# Patient Record
Sex: FEMALE | Race: WHITE
[De-identification: names, ages, dates, MRNs, and addresses within clinical notes are randomized per-mention and may not be internally consistent; named-entity substitution may affect disease eponyms.]

---

## 2019-02-26 ENCOUNTER — RECORD ABSTRACTING (OUTPATIENT)
Age: 59
End: 2019-02-26

## 2019-02-26 DIAGNOSIS — Z82.49 FAMILY HISTORY OF ISCHEMIC HEART DISEASE AND OTHER DISEASES OF THE CIRCULATORY SYSTEM: ICD-10-CM

## 2019-02-26 DIAGNOSIS — Z86.79 PERSONAL HISTORY OF OTHER DISEASES OF THE CIRCULATORY SYSTEM: ICD-10-CM

## 2019-02-26 DIAGNOSIS — Z83.49 FAMILY HISTORY OF OTHER ENDOCRINE, NUTRITIONAL AND METABOLIC DISEASES: ICD-10-CM

## 2019-02-26 DIAGNOSIS — Z80.42 FAMILY HISTORY OF MALIGNANT NEOPLASM OF PROSTATE: ICD-10-CM

## 2019-02-26 DIAGNOSIS — Z84.0 FAMILY HISTORY OF DISEASES OF THE SKIN AND SUBCUTANEOUS TISSUE: ICD-10-CM

## 2019-02-26 DIAGNOSIS — Z78.9 OTHER SPECIFIED HEALTH STATUS: ICD-10-CM

## 2019-02-26 DIAGNOSIS — Z80.9 FAMILY HISTORY OF MALIGNANT NEOPLASM, UNSPECIFIED: ICD-10-CM

## 2019-02-26 LAB — CYTOLOGY CVX/VAG DOC THIN PREP: NORMAL

## 2019-02-26 RX ORDER — VALSARTAN 80 MG/1
80 TABLET ORAL
Refills: 0 | Status: ACTIVE | COMMUNITY

## 2019-02-26 RX ORDER — DOCOSANOL 100 MG/G
10 CREAM TOPICAL
Refills: 0 | Status: ACTIVE | COMMUNITY

## 2019-02-26 RX ORDER — MULTIVIT-MIN/FOLIC/VIT K/LYCOP 400-300MCG
25 MCG TABLET ORAL
Refills: 0 | Status: ACTIVE | COMMUNITY

## 2019-02-26 RX ORDER — ASCORBIC ACID 500 MG
TABLET ORAL
Refills: 0 | Status: ACTIVE | COMMUNITY

## 2019-03-27 ENCOUNTER — APPOINTMENT (OUTPATIENT)
Dept: OBGYN | Facility: CLINIC | Age: 59
End: 2019-03-27
Payer: COMMERCIAL

## 2019-03-27 ENCOUNTER — TRANSCRIPTION ENCOUNTER (OUTPATIENT)
Age: 59
End: 2019-03-27

## 2019-03-27 VITALS
BODY MASS INDEX: 19.85 KG/M2 | SYSTOLIC BLOOD PRESSURE: 118 MMHG | DIASTOLIC BLOOD PRESSURE: 60 MMHG | HEIGHT: 68 IN | WEIGHT: 131 LBS

## 2019-03-27 DIAGNOSIS — R92.2 INCONCLUSIVE MAMMOGRAM: ICD-10-CM

## 2019-03-27 DIAGNOSIS — Z12.31 ENCOUNTER FOR SCREENING MAMMOGRAM FOR MALIGNANT NEOPLASM OF BREAST: ICD-10-CM

## 2019-03-27 DIAGNOSIS — Z00.00 ENCOUNTER FOR GENERAL ADULT MEDICAL EXAMINATION W/OUT ABNORMAL FINDINGS: ICD-10-CM

## 2019-03-27 PROCEDURE — 99396 PREV VISIT EST AGE 40-64: CPT

## 2019-03-27 RX ORDER — ACYCLOVIR 50 MG/G
5 CREAM TOPICAL
Qty: 1 | Refills: 5 | Status: ACTIVE | COMMUNITY
Start: 2019-03-27 | End: 1900-01-01

## 2019-03-27 RX ORDER — VALACYCLOVIR 500 MG/1
500 TABLET, FILM COATED ORAL TWICE DAILY
Qty: 10 | Refills: 3 | Status: ACTIVE | COMMUNITY
Start: 2019-03-27 | End: 1900-01-01

## 2019-04-05 LAB
CYTOLOGY CVX/VAG DOC THIN PREP: NORMAL
HPV HIGH+LOW RISK DNA PNL CVX: NOT DETECTED

## 2020-03-24 DIAGNOSIS — R92.2 INCONCLUSIVE MAMMOGRAM: ICD-10-CM

## 2020-08-26 ENCOUNTER — APPOINTMENT (OUTPATIENT)
Dept: OBGYN | Facility: CLINIC | Age: 60
End: 2020-08-26
Payer: COMMERCIAL

## 2020-08-26 VITALS
BODY MASS INDEX: 20.31 KG/M2 | WEIGHT: 134 LBS | DIASTOLIC BLOOD PRESSURE: 72 MMHG | SYSTOLIC BLOOD PRESSURE: 118 MMHG | HEIGHT: 68 IN

## 2020-08-26 DIAGNOSIS — Z12.11 ENCOUNTER FOR SCREENING FOR MALIGNANT NEOPLASM OF COLON: ICD-10-CM

## 2020-08-26 PROCEDURE — 99396 PREV VISIT EST AGE 40-64: CPT

## 2021-10-28 ENCOUNTER — APPOINTMENT (OUTPATIENT)
Dept: OBGYN | Facility: CLINIC | Age: 61
End: 2021-10-28
Payer: COMMERCIAL

## 2021-10-28 VITALS
SYSTOLIC BLOOD PRESSURE: 110 MMHG | DIASTOLIC BLOOD PRESSURE: 62 MMHG | HEIGHT: 68 IN | BODY MASS INDEX: 19.85 KG/M2 | WEIGHT: 131 LBS

## 2021-10-28 PROCEDURE — 99396 PREV VISIT EST AGE 40-64: CPT

## 2021-10-28 NOTE — HISTORY OF PRESENT ILLNESS
[FreeTextEntry1] :  62yo  postmenopausal here for annual Gyn exam. Has infrequent HSV outbreaks( presacral location) uses Zovirax when symptomatic only. Daughter, Moni, at [place] finished 3rd year of 5 year OT program will finish BS in May then complete program . Dad passed away 2018(massive stroke).Mom(92) still independent living in  55+ community near pt's home in [place]. 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 Got Pfizer vaccine in April. Has a new granchild(stepdaughter's baby) born in March. Has never had colonoscopy and doesn't want to...did Cologuard last year-> negative. Not sexually active(ok with it)\par  \par Last Mammogram was with U/S 21 BIRADS 2D. Last bone density studies 2019 at work "good". Last cervical pap smear was 3/27/19 Neg/HPV Neg. \par \par \par \par OB History\par Total pregnancies  1.  Total living children  1.  Pregnancy 1:  2000, normal spontaneous vaginal delivery (), Birth Weight:6lbs 11oz, Female "Moni" .  NVD  1.

## 2022-06-24 DIAGNOSIS — Z12.39 ENCOUNTER FOR OTHER SCREENING FOR MALIGNANT NEOPLASM OF BREAST: ICD-10-CM

## 2023-02-02 ENCOUNTER — APPOINTMENT (OUTPATIENT)
Dept: OBGYN | Facility: CLINIC | Age: 63
End: 2023-02-02
Payer: COMMERCIAL

## 2023-02-02 ENCOUNTER — NON-APPOINTMENT (OUTPATIENT)
Age: 63
End: 2023-02-02

## 2023-02-02 VITALS
SYSTOLIC BLOOD PRESSURE: 112 MMHG | BODY MASS INDEX: 17.61 KG/M2 | DIASTOLIC BLOOD PRESSURE: 60 MMHG | HEIGHT: 72 IN | WEIGHT: 130 LBS

## 2023-02-02 DIAGNOSIS — Z01.419 ENCOUNTER FOR GYNECOLOGICAL EXAMINATION (GENERAL) (ROUTINE) W/OUT ABNORMAL FINDINGS: ICD-10-CM

## 2023-02-02 DIAGNOSIS — B00.9 HERPESVIRAL INFECTION, UNSPECIFIED: ICD-10-CM

## 2023-02-02 PROCEDURE — 99396 PREV VISIT EST AGE 40-64: CPT

## 2023-02-02 NOTE — HISTORY OF PRESENT ILLNESS
[Patient reported bone density results were normal] : Patient reported bone density results were normal [FreeTextEntry1] : 63yo  postmenopausal here for annual Gyn exam. Has infrequent HSV outbreaks( left presacral location) uses Zovirax when symptomatic only.  had MI with stent placement a few weeks ago. Doing well Daughter, Moni, at [place] in final year of 5 year OT program. Dad passed away 2018(massive stroke).Mom(Shanna) still independent living in 55+ community near pt's home in [place]. Neeta works in [employer],company has changed...stressful. Has two grandchildren(stepdaughter's )Jarvis born in 2021 and Jose J born last month.. Has never had colonoscopy and doesn't want to...did Cologuard -> negative. Not sexually active(ok with it)\par \par \par \par OB History\par Total pregnancies 1. Total living children 1. Pregnancy 1: 2000, normal spontaneous vaginal delivery (), Birth Weight:6lbs 11oz, Female "Moni" . NVD 1. \par  [Mammogramdate] : 10/14/22 [TextBox_19] : BIRADS 2D [BreastSonogramDate] : 10/14/22 [TextBox_25] : BIRADS 2D [PapSmeardate] : 3/27/19 [TextBox_31] : Neg/HPV Neg [BoneDensityDate] : 2019 [ColonoscopyDate] : 2020 [TextBox_43] : Silvestre Saleh

## 2023-02-07 LAB
CYTOLOGY CVX/VAG DOC THIN PREP: ABNORMAL
HPV HIGH+LOW RISK DNA PNL CVX: NOT DETECTED

## 2023-09-01 ENCOUNTER — APPOINTMENT (OUTPATIENT)
Dept: OBGYN | Facility: CLINIC | Age: 63
End: 2023-09-01
Payer: COMMERCIAL

## 2023-09-01 VITALS
DIASTOLIC BLOOD PRESSURE: 60 MMHG | SYSTOLIC BLOOD PRESSURE: 120 MMHG | HEIGHT: 68 IN | BODY MASS INDEX: 19.7 KG/M2 | WEIGHT: 130 LBS

## 2023-09-01 DIAGNOSIS — L02.33 CARBUNCLE OF BUTTOCK: ICD-10-CM

## 2023-09-01 PROCEDURE — 99213 OFFICE O/P EST LOW 20 MIN: CPT

## 2023-09-01 RX ORDER — CEPHALEXIN 500 MG/1
500 CAPSULE ORAL 4 TIMES DAILY
Qty: 14 | Refills: 0 | Status: ACTIVE | COMMUNITY
Start: 2023-09-01 | End: 1900-01-01

## 2023-09-01 RX ORDER — ACYCLOVIR 50 MG/G
5 OINTMENT TOPICAL
Qty: 1 | Refills: 0 | Status: ACTIVE | COMMUNITY

## 2024-01-20 DIAGNOSIS — R92.8 OTHER ABNORMAL AND INCONCLUSIVE FINDINGS ON DIAGNOSTIC IMAGING OF BREAST: ICD-10-CM

## 2024-08-08 ENCOUNTER — APPOINTMENT (OUTPATIENT)
Dept: OBGYN | Facility: CLINIC | Age: 64
End: 2024-08-08

## 2024-08-21 ENCOUNTER — APPOINTMENT (OUTPATIENT)
Dept: OBGYN | Facility: CLINIC | Age: 64
End: 2024-08-21
Payer: COMMERCIAL

## 2024-08-21 VITALS
BODY MASS INDEX: 19.7 KG/M2 | WEIGHT: 130 LBS | DIASTOLIC BLOOD PRESSURE: 72 MMHG | HEIGHT: 68 IN | SYSTOLIC BLOOD PRESSURE: 120 MMHG

## 2024-08-21 DIAGNOSIS — Z01.419 ENCOUNTER FOR GYNECOLOGICAL EXAMINATION (GENERAL) (ROUTINE) W/OUT ABNORMAL FINDINGS: ICD-10-CM

## 2024-08-21 PROCEDURE — 99396 PREV VISIT EST AGE 40-64: CPT

## 2024-08-21 NOTE — HISTORY OF PRESENT ILLNESS
[FreeTextEntry1] : 65yo  postmenopausal here for annual Gyn exam. Has infrequent HSV outbreaks( left presacral location) uses Zovirax when symptomatic only.  had MI with stent placement . Doing well. Daughter, Moni, finished 5 year OT program at [place]- working in [place](living at home). Dad passed away (massive stroke).Mom(Shanna)passed away  Neeta works in [employer],company has changed...stressful. Has two grandchildren(stepdaughter's )Jarvis born in 2021 and Jose J bornJanuary .. Has never had colonoscopy and doesn't want to...did Cologuard -> negative. Not sexually active(ok with it)  OB History Total pregnancies 1. Total living children 1. Pregnancy 1: 2000, normal spontaneous vaginal delivery (), Birth Weight:6lbs 11oz, Female "Moni" . NVD 1.  Preventative Visit:   Mammogram: 24, BIRADS 0 possible asymmetry right breast additional mammo views right requested.Additional imaging done-> NEM   Breast Sonogram:  24 Pap: 23 Neg/HPV Neg Bone Density: 2019, Patient reported bone density results were normal.   Colonoscopy: 23, Cologuard Negative.

## 2024-08-21 NOTE — HISTORY OF PRESENT ILLNESS
[FreeTextEntry1] : 63yo  postmenopausal here for annual Gyn exam. Has infrequent HSV outbreaks( left presacral location) uses Zovirax when symptomatic only.  had MI with stent placement . Doing well. Daughter, Moni, finished 5 year OT program at [employer]- working in [place](living at home). Dad passed away (massive stroke).Mom(Shanna)passed away  Neeta works in [employer],company has changed...stressful. Has two grandchildren(stepdaughter's )Jarvis born in 2021 and Jose J bornJanuary .. Has never had colonoscopy and doesn't want to...did Cologuard -> negative. Not sexually active(ok with it)  OB History Total pregnancies 1. Total living children 1. Pregnancy 1: 2000, normal spontaneous vaginal delivery (), Birth Weight:6lbs 11oz, Female "Moni" . NVD 1.  Preventative Visit:   Mammogram: 24, BIRADS 0 possible asymmetry right breast additional mammo views right requested.Additional imaging done-> NEM   Breast Sonogram:  24 Pap: 23 Neg/HPV Neg Bone Density: 2019, Patient reported bone density results were normal.   Colonoscopy: 23, Cologuard Negative.

## 2025-09-18 ENCOUNTER — NON-APPOINTMENT (OUTPATIENT)
Age: 65
End: 2025-09-18

## 2025-09-18 ENCOUNTER — APPOINTMENT (OUTPATIENT)
Dept: OBGYN | Facility: CLINIC | Age: 65
End: 2025-09-18
Payer: COMMERCIAL

## 2025-09-18 VITALS
SYSTOLIC BLOOD PRESSURE: 134 MMHG | WEIGHT: 128 LBS | DIASTOLIC BLOOD PRESSURE: 80 MMHG | BODY MASS INDEX: 19.4 KG/M2 | HEIGHT: 68 IN

## 2025-09-18 DIAGNOSIS — Z01.419 ENCOUNTER FOR GYNECOLOGICAL EXAMINATION (GENERAL) (ROUTINE) W/OUT ABNORMAL FINDINGS: ICD-10-CM

## 2025-09-18 PROCEDURE — 99397 PER PM REEVAL EST PAT 65+ YR: CPT
